# Patient Record
Sex: MALE | Race: WHITE
[De-identification: names, ages, dates, MRNs, and addresses within clinical notes are randomized per-mention and may not be internally consistent; named-entity substitution may affect disease eponyms.]

---

## 2019-08-18 ENCOUNTER — HOSPITAL ENCOUNTER (INPATIENT)
Dept: HOSPITAL 74 - JER | Age: 74
LOS: 1 days | Discharge: LEFT BEFORE DISCHARGE | DRG: 395 | End: 2019-08-19
Payer: COMMERCIAL

## 2019-08-18 VITALS — BODY MASS INDEX: 33.9 KG/M2

## 2019-08-18 VITALS — HEART RATE: 70 BPM

## 2019-08-18 DIAGNOSIS — G57.83: ICD-10-CM
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
## 2019-08-18 DIAGNOSIS — I73.9: ICD-10-CM

## 2019-08-18 DIAGNOSIS — E78.5: ICD-10-CM
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
## 2019-08-18 DIAGNOSIS — T18.128A: Primary | ICD-10-CM

## 2019-08-18 DIAGNOSIS — K44.9: ICD-10-CM

## 2019-08-18 LAB
ALBUMIN SERPL-MCNC: 3.9 G/DL (ref 3.4–5)
ALP SERPL-CCNC: 106 U/L (ref 45–117)
ALT SERPL-CCNC: 43 U/L (ref 13–61)
ANION GAP SERPL CALC-SCNC: 11 MMOL/L (ref 8–16)
ANISOCYTOSIS BLD QL: 0
AST SERPL-CCNC: 32 U/L (ref 15–37)
BACTERIA # UR AUTO: 0.5 /HPF
BASOPHILS # BLD: 0.4 % (ref 0–2)
BILIRUB SERPL-MCNC: 1.7 MG/DL (ref 0.2–1)
BILIRUB UR STRIP.AUTO-MCNC: (no result) MG/DL
BUN SERPL-MCNC: 27.3 MG/DL (ref 7–18)
CALCIUM SERPL-MCNC: 9.6 MG/DL (ref 8.5–10.1)
CASTS URNS QL MICRO: 14.6 /LPF (ref 0–8)
CHLORIDE SERPL-SCNC: 114 MMOL/L (ref 98–107)
CO2 SERPL-SCNC: 23 MMOL/L (ref 21–32)
CREAT SERPL-MCNC: 1.2 MG/DL (ref 0.55–1.3)
DEPRECATED RDW RBC AUTO: 14.4 % (ref 11.9–15.9)
EOSINOPHIL # BLD: 0.1 % (ref 0–4.5)
EPITH CASTS URNS QL MICRO: 2.5 /HPF
GLUCOSE SERPL-MCNC: 136 MG/DL (ref 74–106)
HCT VFR BLD CALC: 47.7 % (ref 35.4–49)
HGB BLD-MCNC: 16.1 GM/DL (ref 11.7–16.9)
INR BLD: 1.11 (ref 0.83–1.09)
KETONES UR QL STRIP: (no result)
LYMPHOCYTES # BLD: 1.5 % (ref 8–40)
MACROCYTES BLD QL: 0
MCH RBC QN AUTO: 29.2 PG (ref 25.7–33.7)
MCHC RBC AUTO-ENTMCNC: 33.7 G/DL (ref 32–35.9)
MCV RBC: 86.8 FL (ref 80–96)
MONOCYTES # BLD AUTO: 3.6 % (ref 3.8–10.2)
NEUTROPHILS # BLD: 94.4 % (ref 42.8–82.8)
PH UR: 5.5 [PH] (ref 5–8)
PLATELET # BLD AUTO: 298 K/MM3 (ref 134–434)
PLATELET BLD QL SMEAR: NORMAL
PMV BLD: 9.1 FL (ref 7.5–11.1)
POTASSIUM SERPLBLD-SCNC: 3.8 MMOL/L (ref 3.5–5.1)
PROT SERPL-MCNC: 7.8 G/DL (ref 6.4–8.2)
PROT UR QL STRIP: (no result)
PT PNL PPP: 13.1 SEC (ref 9.7–13)
RBC # BLD AUTO: 1.4 /HPF (ref 0–4)
RBC # BLD AUTO: 5.5 M/MM3 (ref 4–5.6)
SODIUM SERPL-SCNC: 148 MMOL/L (ref 136–145)
SP GR UR: 1.02 (ref 1.01–1.03)
UROBILINOGEN UR STRIP-MCNC: 0.2 MG/DL (ref 0.2–1)
WBC # BLD AUTO: 18 K/MM3 (ref 4–10)
WBC # UR AUTO: 1.3 /HPF (ref 0–5)
WBC NRBC COR # BLD: 100 K/MM3

## 2019-08-18 NOTE — PDOC
History of Present Illness





- General


Chief Complaint: Choking Sensation


Stated Complaint: DYSPHAGIA / SOB


Time Seen by Provider: 08/18/19 12:51


History Source: Patient


Exam Limitations: No Limitations





- History of Present Illness


Initial Comments: 





08/18/19 13:13





74M w/ hiatal hernia, HLD, spinal fusion(L4-L5), BLE neuropathy presents with 

complaint of sticking sensation in the throat x1.5days after ingesting steak on 

Friday night(8/16/19). Had an immediate sensation of something sticking in his 

lower chest. Since Friday night, could not tolerate PO intake of food or water, 

experience immediate spitting things up. Has had inability to tolerate saliva, 

constantly spitting. Has hiccups. Denies dysphonia. Feels better standing 

upright. Feels increased discomfort when lying down. Saw UC one day prior, XR 

did not show anything and sent home with metoclopramide. Pt's symptoms did not 

improve. Tried [employer] chocolate milkshake prior to presenting to [facility]. 

Denies F/C/CP/SOB/palpitations. Years prior, had steak stuck in his throat that 

prompted an ED visit that eventually passed after glucagon 1mg x2. Has had EGDs 

~q5ys with biopsies taken, no balloon dilations. Had Barium Swallow study in 

May 2019 showed a small sliding hernia, no esoph strictures.





Associated Symptoms: denies: chest pain, cough, diaphoresis, fever/chills, 

headaches, nausea/vomiting (spits up food after ingestion), shortness of breath

, syncope





Past History





- Travel


Traveled outside of the country in the last 30 days: No


Close contact w/someone who was outside of country & ill: No





- Past Medical History


Allergies/Adverse Reactions: 


 Allergies











Allergy/AdvReac Type Severity Reaction Status Date / Time


 


No Known Allergies Allergy   Verified 08/18/19 12:21











Home Medications: 


Ambulatory Orders





Ascorbic Acid [Vitamin C] 500 mg PO DAILY 10/27/16 


Calcium (Oyster Shell) [Os-Mitchel 500MG -] 500 mg PO DAILY 10/27/16 


Furosemide [Lasix -] 40 mg PO ASDIR 10/27/16 


Potassium Chloride 10 meq PO ASDIR 10/27/16 


Simvastatin 10 mg PO HS 10/27/16 


Acetaminophen [Tylenol .Regular Strength -] 650 mg PO Q4H PRN #0 tablet 10/28/

16 


Cyanocobalamin [Vitamin B12 -] 1,000 mcg PO DAILY 08/18/19 


Famotidine [Pepcid] 40 mg PO DAILY 08/18/19 


Metoclopramide HCl [Reglan -] 10 mg PO QID PRN 08/18/19 








Anemia: No


Asthma:  (c-pap at night)


Cancer: No


Cardiac Disorders: No


CVA: No


COPD: No


CHF: No


Dementia: No


Diabetes: No


GI Disorders: No


 Disorders: No


HTN: No


Hypercholesterolemia: Yes


Liver Disease: No


Seizures: No


Thyroid Disease: No


Other medical history: chronic venous insufficiency, neuropathy





- Surgical History


Abdominal Surgery: No


Appendectomy: No


Cardiac Surgery: No


Cholecystectomy: No


Lung Surgery: No


Neurologic Surgery: Yes (back, L4-5)


Orthopedic Surgery: No


Other Surgical History: 





08/18/19 13:59


BLE vein sx





- Family Disease History


Family Disease History: Heart Disease: Father (MI, HTN), Mother (HTN)





- Immunization History


Immunization Up to Date: Yes





- Suicide/Smoking/Psychosocial Hx


Smoking Status: No


Smoking History: Never smoked


Have you smoked in the past 12 months: No


Number of Cigarettes Smoked Daily: 0


Hx Alcohol Use: No


Drug/Substance Use Hx: No


Substance Use Type: None


Hx Substance Use Treatment: No





**Review of Systems





- Review of Systems


Able to Perform ROS?: Yes


Is the patient limited English proficient: No


Constitutional: No: Chills, Diaphoresis, Fever


HEENTM: Yes: Difficulty Swallowing, Mouth Swelling (will immediately spit up 

food/water/saliva).  No: Eye Pain, Blurred Vision


Respiratory: No: Cough, Orthopnea, Shortness of Breath, Stridor, Wheezing


Cardiac (ROS): No: Chest Pain (sensation of sticking in lower midline chest), 

Irregular Heart Rate, Palpitations


ABD/GI: Yes: Difficulty Swallowing.  No: Constipated, Diarrhea, Nausea, Vomiting

, Abdominal cramping


: No: Burning, Dysuria, Urgency


Integumentary: No: Erythema, Flushing


Neurological: No: Headache, Numbness, Dizziness





*Physical Exam





- Vital Signs


 Last Vital Signs











Temp Pulse Resp BP Pulse Ox


 


 98.3 F   83   18   143/87   100 


 


 08/18/19 12:22  08/18/19 12:22  08/18/19 12:22  08/18/19 12:22  08/18/19 12:22














- Physical Exam


General Appearance: Yes: Mild Distress.  No: Obese


HEENT: positive: Normal Voice, Excessive drooling (spitting clear and brown 

saliva).  negative: Scleral Icterus (R), Scleral Icterus (L)


Neck: positive: Trachea midline.  negative: Stridor, Lymphadenopathy (R), 

Lymphadenopathy (L)


Respiratory/Chest: positive: Lungs Clear, Normal Breath Sounds.  negative: 

Respiratory Distress, Labored Respiration, Crackles, Rales, Rhonchi, Stridor, 

Wheezing


Cardiovascular: positive: Regular Rhythm, Regular Rate, S1, S2


Gastrointestinal/Abdominal: positive: Soft, Other (midline rectus diastasis).  

negative: Distended, Guarding, Rebound


Extremity: positive: Normal Range of Motion.  negative: Swelling, Calf 

Tenderness, Erythema


Integumentary: positive: Dry, Warm


Neurologic: positive: Alert





ED Treatment Course





- LABORATORY


CBC & Chemistry Diagram: 


 08/18/19 14:00





 08/18/19 14:00





Medical Decision Making





- Medical Decision Making





08/18/19 14:04





- fu CBC, CMP, INR, T&S


- fu CXR, neck XR


- fu EKG


- administer glucagon 2mg IV








08/18/19 14:54


- paged GI(Dr Charles Gonzales -- [facility])





08/18/19 15:56


- Dr Gonzales called back, no longer comes to [facility] 


- Dr Lopez contacted -- recommends another glucagon 2mg IV, admission, 

possible EGD tonight


- patient informed of his bloodwork results and recommendation for admission 

with possible EGD tonight





08/18/19 23:57


- patient endorses feeling better, felt a sensation that something has passed 

into his stomach


- Dr Lopez evaluated pt, believes that EGD is no longer indicated, recommends 

discharge





*DC/Admit/Observation/Transfer


Diagnosis at time of Disposition: 


 Other foreign object in esophagus causing other injury, initial encounter








- Discharge Dispostion


Condition at time of disposition: Stable


Decision to Admit order: Yes





- Referrals





- Patient Instructions





- Post Discharge Activity

## 2019-08-18 NOTE — PDOC
Attending Attestation





- Resident


Resident Name: Karel Murry





- ED Attending Attestation


I have performed the following: I have examined & evaluated the patient, The 

case was reviewed & discussed with the resident, I agree w/resident's findings 

& plan, Exceptions are as noted





- HPI


HPI: 





75 yo M history prior food impaction, hiatal hernia presents with suspected 

food impaction. He states that he ate a piece of steak 2 days ago, felt as if 

it was stuck. He has tried ginger ale, water, but nothing has helped. He tried 

to drink a chocolate shake today to help move the food down, but to no avail. 

He states he feels as if it is stuck at the base of his chest. He has been 

regurgitating anything he tries to swallow, and after he vomits, he gets 

hiccups. 








- Physicial Exam


PE: 





GENERAL: Awake, alert, and fully oriented, in no acute distress. +Intermittent 

hiccups


HEAD: No signs of trauma


EYES: PERRLA, EOMI, sclera anicteric, conjunctiva clear


ENT: Auricles normal inspection, hearing grossly normal, nares patent, 

oropharynx clear without exudates. Moist mucosa


NECK: Normal ROM, supple, no lymphadenopathy, JVD, or masses


LUNGS: Breath sounds equal, clear to auscultation bilaterally.  No wheezes, and 

no crackles


HEART: Regular rate and rhythm, normal S1 and S2, no murmurs, rubs or gallops


ABDOMEN: Soft, nontender, normoactive bowel sounds.  No guarding, no rebound.  

No masses


EXTREMITIES: Normal range of motion, no edema.  No clubbing or cyanosis. No 

cords, erythema, or tenderness


NEUROLOGICAL: Cranial nerves II through XII grossly intact.  Normal speech, 

normal gait. Motor and sensation intact


SKIN: Warm, dry, normal turgor, no rashes or lesions noted.











- Medical Decision Making





08/18/19 13:47


Pt had similar symptoms once in the past, and glucagon helped him to pass the 

food bolus. Will give a trial of glucagon, as he indicates the lower esophagus 

as the location of the impaction. If unsuccessful, will call GI for assistance.

## 2019-08-19 VITALS — TEMPERATURE: 98.2 F | DIASTOLIC BLOOD PRESSURE: 62 MMHG | SYSTOLIC BLOOD PRESSURE: 132 MMHG

## 2019-08-19 NOTE — HOSP
Subjective





- Review of Symptoms


Events since last encounter: 





Hospitalist Encounter


Was notified by the primary RN that the patient adamantly refusing to stay, 

wants to leave right now. Was asked to assess.  








Physical Examination


Vital Signs: 


 Vital Signs











Temperature  98.2 F   08/19/19 01:12


 


Pulse Rate  70   08/19/19 01:12


 


Respiratory Rate  18   08/19/19 01:12


 


Blood Pressure  132/62   08/19/19 01:12


 


O2 Sat by Pulse Oximetry (%)  98   08/19/19 01:12











Labs: 


 CBC, BMP





 08/18/19 14:00 





 08/18/19 14:00

## 2019-08-19 NOTE — EKG
Test Reason : 

Blood Pressure : ***/*** mmHG

Vent. Rate : 076 BPM     Atrial Rate : 076 BPM

   P-R Int : 168 ms          QRS Dur : 096 ms

    QT Int : 378 ms       P-R-T Axes : 029 -27 002 degrees

   QTc Int : 425 ms

 

NORMAL SINUS RHYTHM

POSSIBLE LEFT ATRIAL ENLARGEMENT

LEFT VENTRICULAR HYPERTROPHY

ABNORMAL ECG

WHEN COMPARED WITH ECG OF 28-OCT-2016 09:26,

ST NO LONGER ELEVATED IN INFERIOR LEADS

ST NO LONGER ELEVATED IN LATERAL LEADS

NONSPECIFIC T WAVE ABNORMALITY NOW EVIDENT IN LATERAL LEADS

Confirmed by MIRNA OSUNA MD ([id number]) on 8/19/2019 11:10:52 AM

 

Referred By:             Confirmed By:MIRNA OSUNA MD

## 2019-08-19 NOTE — CONS
DATE OF CONSULTATION:  08/18/2019

 

Patient is a 74-year-old man with a past medical history of hiatal hernia,

hyperlipidemia, spinal fusion, previous episode of food impaction approximately 5

years ago who now presents with sensation of food sticking in his throat since Friday

night, at which time he ate steak.  Since Friday, he did not tolerate p.o. intake. 

He presented to the urgent care center and was given Reglan and sent home.  The

patient has been improved.  Earlier today he tried a [employer] milkshake, but again

could not tolerate p.o. intake, prompting him to come to the emergency room for

further evaluation.  While in the ER, he received 2 doses of glucagon and short while

ago was able to tolerate water and feels great at this time.  He denies any chest

pain, shortness of breath, palpitations, fevers, chills.  He states he is feeling

well.  He is a patient of Dr. Gonzales and had a barium swallow in May of 2019, which

showed this small sliding hernia.  No strictures at the time.  

 

PAST MEDICAL/SURGICAL HISTORY:  As listed in the HPI, additional surgical history of

vein surgery.

 

HOME MEDICATIONS:  Vitamin C, Lasix, potassium, simvastatin, vitamin B12, Pepcid, and

Reglan.

 

ALLERGIES:  No known drug allergies. 

 

FAMILY HISTORY:  Significant for heart disease.

 

SOCIAL HISTORY:  Does not smoke, does not drink or use drugs.  

 

REVIEW OF SYSTEMS:  As per the HPI.

 

PHYSICAL EXAMINATION:   

Vital Signs:  Temperature 98, pulse 70, respiratory rate 12, oxygen saturation 98% on

room air, blood pressure 130/64.

General:  In no acute distress.  This is a pleasant man.

HEENT:  Anicteric sclerae.  

Cardiovascular:  S1, S2.  Regular rate and rhythm.  

Lungs:  Bilaterally clear to auscultation.

Abdomen:  Soft, nontender.  

Extremities:  No edema.  

 

LABORATORY:  White blood cell count 18, hemoglobin 16, hematocrit 47, platelet count

298, INR 1.1.  Sodium 148, potassium 3.8, BUN 27, creatinine 1.2, glucose of 136,

total bilirubin 1.7, AST 32, ALT 43, alkaline phosphatase 106.  Urine with ketones. 

Chest x-ray:  Without any acute process.  Soft tissue and neck x-ray:  No gross

issues. 

 

IMPRESSION:  Dysphagia, currently resolved.  

 

RECOMMENDATIONS:  Considering he is tolerating p.o. intake, he can be discharged with

outpatient follow up with his gastroenterologist for diagnostic upper endoscopy. 

Recommend a soft diet for the next couple of weeks until he is examined with an upper

endoscopy and PPI therapy.  

 

 

DO LISA NUNEZ/[id number]

DD: 08/18/2019 23:31

DT: 08/19/2019 00:14

Job #:  [id number]

## 2019-08-20 ENCOUNTER — HOSPITAL ENCOUNTER (INPATIENT)
Dept: HOSPITAL 74 - JER | Age: 74
LOS: 3 days | Discharge: HOME | DRG: 394 | End: 2019-08-23
Payer: COMMERCIAL

## 2019-08-20 VITALS — BODY MASS INDEX: 34.2 KG/M2

## 2019-08-20 DIAGNOSIS — T18.128A: Primary | ICD-10-CM
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
## 2019-08-20 DIAGNOSIS — I10: ICD-10-CM

## 2019-08-20 DIAGNOSIS — R13.10: ICD-10-CM
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
## 2019-08-20 DIAGNOSIS — E78.5: ICD-10-CM
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
## 2019-08-20 DIAGNOSIS — R78.81: ICD-10-CM
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
## 2019-08-20 LAB
ALBUMIN SERPL-MCNC: 3.4 G/DL (ref 3.4–5)
ALP SERPL-CCNC: 105 U/L (ref 45–117)
ALT SERPL-CCNC: 77 U/L (ref 13–61)
ANION GAP SERPL CALC-SCNC: 6 MMOL/L (ref 8–16)
APPEARANCE UR: CLEAR
AST SERPL-CCNC: 55 U/L (ref 15–37)
BASOPHILS # BLD: 0.6 % (ref 0–2)
BILIRUB SERPL-MCNC: 1.2 MG/DL (ref 0.2–1)
BILIRUB UR STRIP.AUTO-MCNC: NEGATIVE MG/DL
BUN SERPL-MCNC: 18.2 MG/DL (ref 7–18)
CALCIUM SERPL-MCNC: 9 MG/DL (ref 8.5–10.1)
CHLORIDE SERPL-SCNC: 109 MMOL/L (ref 98–107)
CO2 SERPL-SCNC: 26 MMOL/L (ref 21–32)
COLOR UR: YELLOW
CREAT SERPL-MCNC: 1.2 MG/DL (ref 0.55–1.3)
DEPRECATED RDW RBC AUTO: 14.5 % (ref 11.9–15.9)
EOSINOPHIL # BLD: 2.3 % (ref 0–4.5)
GLUCOSE SERPL-MCNC: 92 MG/DL (ref 74–106)
HCT VFR BLD CALC: 46.5 % (ref 35.4–49)
HGB BLD-MCNC: 15.6 GM/DL (ref 11.7–16.9)
INR BLD: 1.08 (ref 0.83–1.09)
KETONES UR QL STRIP: NEGATIVE
LEUKOCYTE ESTERASE UR QL STRIP.AUTO: NEGATIVE
LYMPHOCYTES # BLD: 7.4 % (ref 8–40)
MCH RBC QN AUTO: 29.4 PG (ref 25.7–33.7)
MCHC RBC AUTO-ENTMCNC: 33.4 G/DL (ref 32–35.9)
MCV RBC: 87.9 FL (ref 80–96)
MONOCYTES # BLD AUTO: 15.3 % (ref 3.8–10.2)
NEUTROPHILS # BLD: 74.4 % (ref 42.8–82.8)
NITRITE UR QL STRIP: NEGATIVE
PH BLDV: 7.39 [PH] (ref 7.31–7.41)
PH UR: 5 [PH] (ref 5–8)
PLATELET # BLD AUTO: 220 K/MM3 (ref 134–434)
PMV BLD: 8.7 FL (ref 7.5–11.1)
POTASSIUM SERPLBLD-SCNC: 4.1 MMOL/L (ref 3.5–5.1)
PROT SERPL-MCNC: 6.9 G/DL (ref 6.4–8.2)
PROT UR QL STRIP: NEGATIVE
PROT UR QL STRIP: NEGATIVE
PT PNL PPP: 12.8 SEC (ref 9.7–13)
RBC # BLD AUTO: 5.3 M/MM3 (ref 4–5.6)
SODIUM SERPL-SCNC: 140 MMOL/L (ref 136–145)
SP GR UR: 1.03 (ref 1.01–1.03)
UROBILINOGEN UR STRIP-MCNC: 1 MG/DL (ref 0.2–1)
VENOUS PC02: 41.3 MMHG (ref 38–52)
VENOUS PO2: 50.5 MMHG (ref 28–48)
WBC # BLD AUTO: 5 K/MM3 (ref 4–10)

## 2019-08-20 PROCEDURE — C1751 CATH, INF, PER/CENT/MIDLINE: HCPCS

## 2019-08-20 RX ADMIN — PIPERACILLIN SODIUM,TAZOBACTAM SODIUM SCH MLS/HR: 3; .375 INJECTION, POWDER, FOR SOLUTION INTRAVENOUS at 20:53

## 2019-08-20 RX ADMIN — ATORVASTATIN CALCIUM SCH MG: 10 TABLET, FILM COATED ORAL at 23:07

## 2019-08-20 NOTE — PDOC
History of Present Illness





- General


Chief Complaint: Revisit, Lab Variance


Stated Complaint: REVISIT


Time Seen by Provider: 08/20/19 14:33


History Source: Patient


Exam Limitations: No Limitations





- History of Present Illness


Initial Comments: 





08/20/19 17:11


75yo M with PMH of HLD, spinal fusion 2014 presenting to ED for positive blood 

culture results. Patient says he was here 3d ago for food bolus, started to 

feel better and left. His WBC was 14 so blood cultures were drawn. Patient says 

that since he left the hospital he felt fine. Denies fever, chills, cough, n/v/d

, headache, rashes, urinary symptoms. No mechanical or prosthetic valves or 

stents, no recent surgeries, no recent illnesses or antibiotic use. 





PMD: Riley


PMH: see hpi


PSH: see hpi


Meds: see med rec


Allergies: nkda








Past History





- Past Medical History


Allergies/Adverse Reactions: 


 Allergies











Allergy/AdvReac Type Severity Reaction Status Date / Time


 


No Known Allergies Allergy   Verified 08/20/19 14:36











Home Medications: 


Ambulatory Orders





Ascorbic Acid [Vitamin C] 500 mg PO DAILY 10/27/16 


Calcium (Oyster Shell) [Os-Mitchel 500MG -] 500 mg PO DAILY 10/27/16 


Furosemide [Lasix -] 40 mg PO ASDIR 10/27/16 


Potassium Chloride 10 meq PO ASDIR 10/27/16 


Simvastatin 10 mg PO HS 10/27/16 


Acetaminophen [Tylenol .Regular Strength -] 650 mg PO Q4H PRN #0 tablet 10/28/

16 


Cyanocobalamin [Vitamin B12 -] 1,000 mcg PO DAILY 08/18/19 


Famotidine [Pepcid] 40 mg PO DAILY 08/18/19 


Metoclopramide HCl [Reglan -] 10 mg PO QID PRN 08/18/19 








Anemia: No


Asthma:  (c-pap at night)


Cancer: No


Cardiac Disorders: No


CVA: No


COPD: No


CHF: No


Dementia: No


Diabetes: No


GI Disorders: No


 Disorders: No


HTN: No


Hypercholesterolemia: Yes


Liver Disease: No


Seizures: No


Thyroid Disease: No





- Surgical History


Abdominal Surgery: No


Appendectomy: No


Cardiac Surgery: No


Cholecystectomy: No


Lung Surgery: No


Neurologic Surgery: Yes (back, L4-5)


Orthopedic Surgery: No





- Family Disease History


Family Disease History: Heart Disease: Father (MI, HTN), Mother (HTN)





- Immunization History


Immunization Up to Date: Yes





- Suicide/Smoking/Psychosocial Hx


Smoking Status: No


Smoking History: Never smoked


Have you smoked in the past 12 months: No


Number of Cigarettes Smoked Daily: 0


Hx Alcohol Use: No


Drug/Substance Use Hx: No


Substance Use Type: None


Hx Substance Use Treatment: No





**Review of Systems





- Review of Systems


Constitutional: No: Symptoms Reported


HEENTM: No: Symptoms Reported


Respiratory: No: Symptoms reported


Cardiac (ROS): No: Symptoms Reported


ABD/GI: No: Symptoms Reported


: No: Symptoms Reported


Musculoskeletal: No: Symptoms Reported


Integumentary: No: Symptoms Reported


Neurological: No: Symptoms reported





*Physical Exam





- Vital Signs


 Last Vital Signs











Temp Pulse Resp BP Pulse Ox


 


 98.1 F   76   18   140/62   99 


 


 08/20/19 14:34  08/20/19 14:34  08/20/19 14:34  08/20/19 14:34  08/20/19 14:34














- Physical Exam


General Appearance: Yes: Nourished, Appropriately Dressed.  No: Apparent 

Distress


HEENT: positive: EOMI, KAR, Normal ENT Inspection


Neck: positive: Trachea midline, Supple.  negative: Lymphadenopathy (R), 

Lymphadenopathy (L)


Respiratory/Chest: positive: Lungs Clear, Normal Breath Sounds.  negative: 

Crackles, Rales, Rhonchi, Stridor, Wheezing


Cardiovascular: positive: Regular Rhythm, Regular Rate, S1, S2.  negative: Edema

, JVD, Murmur


Gastrointestinal/Abdominal: positive: Normal Bowel Sounds, Soft.  negative: 

Tender


Musculoskeletal: negative: CVA Tenderness


Extremity: positive: Normal Capillary Refill.  negative: Swelling, Calf 

Tenderness


Integumentary: positive: Normal Color, Dry, Warm


Neurologic: positive: CNs II-XII NML intact, Fully Oriented, Alert, Normal Mood/

Affect, Normal Response, Motor Strength 5/5





ED Treatment Course





- LABORATORY


CBC & Chemistry Diagram: 


 08/20/19 15:17





 08/20/19 15:17





- ADDITIONAL ORDERS


Additional order review: 


 Laboratory  Results











  08/20/19 08/20/19 08/20/19





  15:17 15:17 15:17


 


PT with INR    12.80


 


INR    1.08


 


PTT (Actin FS)   


 


VBG pH  7.39  


 


POC VBG pCO2  41.3  


 


POC VBG pO2  50.5 H  


 


VBG HCO3  24.5  


 


VBG O2 Sat (Christin)  84.8 H  


 


VBG Base Excess  0  


 


Sodium   


 


Potassium   


 


Chloride   


 


Carbon Dioxide   


 


Anion Gap   


 


BUN   


 


Creatinine   


 


Est GFR (CKD-EPI)AfAm   


 


Est GFR (CKD-EPI)NonAf   


 


Random Glucose   


 


Lactic Acid   


 


Calcium   


 


Total Bilirubin   


 


AST   


 


ALT   


 


Alkaline Phosphatase   


 


Total Protein   


 


Albumin   


 


Urine Color   Yellow 


 


Urine Appearance   Clear 


 


Urine pH   5.0 


 


Ur Specific Gravity   1.027 


 


Urine Protein   Negative 


 


Urine Glucose (UA)   Negative 


 


Urine Ketones   Negative 


 


Urine Blood   Negative 


 


Urine Nitrite   Negative 


 


Urine Bilirubin   Negative 


 


Urine Urobilinogen   1.0 


 


Ur Leukocyte Esterase   Negative 














  08/20/19 08/20/19 08/20/19





  15:17 15:17 15:17


 


PT with INR   


 


INR   


 


PTT (Actin FS)    31.1


 


VBG pH   


 


POC VBG pCO2   


 


POC VBG pO2   


 


VBG HCO3   


 


VBG O2 Sat (Christin)   


 


VBG Base Excess   


 


Sodium   140 


 


Potassium   4.1 


 


Chloride   109 H 


 


Carbon Dioxide   26 


 


Anion Gap   6 L 


 


BUN   18.2 H 


 


Creatinine   1.2 


 


Est GFR (CKD-EPI)AfAm   69.60 


 


Est GFR (CKD-EPI)NonAf   60.05 


 


Random Glucose   92 


 


Lactic Acid  1.6  


 


Calcium   9.0 


 


Total Bilirubin   1.2 H 


 


AST   55 H 


 


ALT   77 H 


 


Alkaline Phosphatase   105 


 


Total Protein   6.9 


 


Albumin   3.4 


 


Urine Color   


 


Urine Appearance   


 


Urine pH   


 


Ur Specific Gravity   


 


Urine Protein   


 


Urine Glucose (UA)   


 


Urine Ketones   


 


Urine Blood   


 


Urine Nitrite   


 


Urine Bilirubin   


 


Urine Urobilinogen   


 


Ur Leukocyte Esterase   








 











  08/20/19





  15:17


 


RBC  5.30


 


MCV  87.9


 


MCHC  33.4


 


RDW  14.5


 


MPV  8.7


 


Neutrophils %  74.4  D


 


Lymphocytes %  7.4 L D


 


Monocytes %  15.3 H D


 


Eosinophils %  2.3  D


 


Basophils %  0.6














Medical Decision Making





- Medical Decision Making





08/20/19 17:45


75yo M with PMH of HLD, spinal fusion 2014 presenting to ED for positive blood 

culture results. Patient says he was here 3d ago for food bolus, started to 

feel better and left. His WBC was 14 so blood cultures were drawn. Patient says 

that since he left the hospital he felt fine. Denies fever, chills, cough, n/v/d

, headache, rashes, urinary symptoms. No mechanical or prosthetic valves or 

stents, no recent surgeries, no recent illnesses or antibiotic use. 


   vitals normal, afebrile


   pe: normal





patient had positive blood cultures in anaerobic and aerobic vials growing gram 

positive cocci in chains. had wbc of 18 when cultures were drawn. cannot 

definitively say it is contaminant. 





labs done today, no white count however given positive cultures, will admit for 

serial cultures and checking strain. 





*DC/Admit/Observation/Transfer


Diagnosis at time of Disposition: 


 Abnormal laboratory test, Positive blood culture








- Discharge Dispostion


Condition at time of disposition: Good


Decision to Admit order: Yes





- Referrals


Referrals: 


Inna Lin MD [Primary Care Provider] - 





- Patient Instructions





- Post Discharge Activity

## 2019-08-20 NOTE — HP
Admitting History and Physical





- Primary Care Physician


PCP: Inna Lin





- Admission


Chief Complaint: returned for positive blood cultures


History of Present Illness: 





Patient is a 74-year-old male with a past medical history of HLD, spinal fusion 

2014 and dysphagia.  He presents to the ED today after he was called to return 

to the ED for positive blood culture results.  Patient was in the ED on 8/18/19 

with impacted food bolus.  WBC on that admission was 18K.  Blood cultures were 

collected and he was sent home with GI follow up.  Returns to the ED today when 

his blood cultures were noted to be growing +gram cocci. 





The patient denies any fevers, chills, nausea, vomiting, diarrhea, or abdominal 

pain. Denies any chest pain, palpitations, or shortness of breath. Denies any 

headache, weakness, dizziness, lightheadedness, or changes in strength or 

sensation.  





On admission, WBC 5.0, and blood and urine cultures pending.  





 


History Source: Patient, Family Member


Limitations to Obtaining History: No Limitations





- Past Medical History


Cardiovascular: Yes: HTN, Hyperlipdemia





- Past Surgical History


Past Surgical History: Yes: Vein Stripping/Ligation (40 years ago)





- Smoking History


Smoking history: Never smoked


Have you smoked in the past 12 months: No


Aproximately how many cigarettes per day: 0





- Alcohol/Substance Use


Hx Alcohol Use: No


History of Substance Use: reports: None





- Social History


Usual Living Arrangement: Yes: With Spouse


Occupation: retired


History of Recent Travel: No





Home Medications





- Allergies


Allergies/Adverse Reactions: 


 Allergies











Allergy/AdvReac Type Severity Reaction Status Date / Time


 


No Known Allergies Allergy   Verified 08/20/19 14:36














- Home Medications


Home Medications: 


Ambulatory Orders





Ascorbic Acid [Vitamin C] 500 mg PO DAILY 10/27/16 


Calcium (Oyster Shell) [Os-Mitchel 500MG -] 500 mg PO DAILY 10/27/16 


Furosemide [Lasix -] 40 mg PO ASDIR 10/27/16 


Potassium Chloride 10 meq PO ASDIR 10/27/16 


Simvastatin 10 mg PO HS 10/27/16 


Acetaminophen [Tylenol .Regular Strength -] 650 mg PO Q4H PRN #0 tablet 10/28/

16 


Cyanocobalamin [Vitamin B12 -] 1,000 mcg PO DAILY 08/18/19 


Famotidine [Pepcid] 40 mg PO DAILY 08/18/19 


Metoclopramide HCl [Reglan -] 10 mg PO QID PRN 08/18/19 











Family Disease History





- Family Disease History


Family History: Denies





Review of Systems





- Review of Systems


Constitutional: reports: No Symptoms


Eyes: reports: No Symptoms


HENT: reports: Difficult Swallowing


Neck: reports: No Symptoms


Cardiovascular: reports: No Symptoms


Respiratory: reports: Other (mild wheezing on right upper lobe, has hx of sleep 

apnea and uses cpap at night)


Genitourinary: reports: No Symptoms





Physical Examination


Vital Signs: 


 Vital Signs











Temperature  98.1 F   08/20/19 14:34


 


Pulse Rate  76   08/20/19 14:34


 


Respiratory Rate  18   08/20/19 14:34


 


Blood Pressure  140/62   08/20/19 14:34


 


O2 Sat by Pulse Oximetry (%)  99   08/20/19 14:34











Constitutional: Yes: Well Nourished, No Distress, Calm


Eyes: Yes: WNL, Conjunctiva Clear


HENT: Yes: WNL, Atraumatic


Neck: Yes: Supple


Cardiovascular: Yes: Regular Rate and Rhythm


Respiratory: Yes: Regular, Wheezes (mild), Other


Gastrointestinal: Yes: WNL


...Rectal Exam: Yes: Deferred


Edema: No


Labs: 


 CBC, BMP





 08/20/19 15:17 





 08/20/19 15:17 











Imaging





- Results


Chest X-ray: Report Reviewed





Problem List





- Problems


(1) Positive blood culture


Assessment/Plan: 


WBC 18 on 8/18/2019 with + blood cultures (both bottles) growing gram positive 

cocci in chains


Repeat blood and urine cultures pending


no fever, no chills, lactic acid within normal limits


will empirically treat overnight pending repeat blood cultures


chest xray negative


Code(s): R78.81 - BACTEREMIA   





(2) Dysphagia


Assessment/Plan: 


soft diet


GI follow up as an outpatient


Code(s): R13.10 - DYSPHAGIA, UNSPECIFIED   





(3) Prophylactic measure


Assessment/Plan: 


fen


tolerating PO


monitor electrolytes


low salt diet





Code(s): Z29.9 - ENCOUNTER FOR PROPHYLACTIC MEASURES, UNSPECIFIED   





Visit type





- Emergency Visit


Emergency Visit: Yes


Care time: The patient presented to the Emergency Department on the above date 

and was hospitalized for further evaluation of their emergent condition.





- New Patient


This patient is new to me today: Yes


Date on this admission: 08/20/19





- Critical Care


Critical Care patient: No

## 2019-08-20 NOTE — PDOC
Rapid Medical Evaluation


Chief Complaint: Revisit, Lab Variance


Time Seen by Provider: 08/20/19 14:33


Medical Evaluation: 


 Allergies











Allergy/AdvReac Type Severity Reaction Status Date / Time


 


No Known Allergies Allergy   Verified 08/18/19 12:21











08/20/19 14:34


HPI: Told to return to the ER for + BC





PE: no gross deficits





ORDERS: Labs 





**Discharge Disposition





- Diagnosis


 Abnormal laboratory test








- Referrals





- Patient Instructions





- Post Discharge Activity

## 2019-08-20 NOTE — PDOC
Documentation entered by Laura Teixeira SCRIBE, acting as scribe for Lesli Blankenship MD.








Lesli Blankenship MD:  This documentation has been prepared by the scribe, Laura Teixeira SCRIBE, under my direction and personally reviewed by me in its 

entirety.  I confirm that the documentation accurately reflects all work, 

treatment, procedures, and medical decision making performed by me.  





Attending Attestation





- Resident


Resident Name: Birgit Carvalho





- ED Attending Attestation


I have performed the following: I have examined & evaluated the patient, The 

case was reviewed & discussed with the resident, I agree w/resident's findings 

& plan, Exceptions are as noted





- HPI


HPI: 





08/20/19 17:49


The patient is a 74-year-old male, with a past medical history of HLD, spinal 

fusion - 2014, who presents to the ED with positive blood culture results. The 

patient was seen in the ER on 08/18/19 for an impacted food bolus and had a 

white count of 18,000. He was discharged with GI follow up. 





The patient denies any fevers, chills, nausea, vomiting, diarrhea, or abdominal 

pain. Denies any chest pain, palpitations, or shortness of breath. Denies any 

headache, weakness, dizziness, lightheadedness, or changes in strength or 

sensation.





Allergies: NKA





- Physicial Exam


PE: 





08/20/19 17:50


NAD, well appearing, EOMI, PERRL, MMM, nl conjunctiva, anicteric; neck supple. 

lungs clear, RRR, abdomen soft nontender. Back nontender. LABOY x4, no focal 

neuro deficits. No peripheral edema. normal color for ethnicity, WWP.








- Medical Decision Making





08/20/19 18:29\


hpi as documented


VS reviewed, wnl


Vital Signs











Temp Pulse Resp BP Pulse Ox


 


 98.1 F   76   18   140/62   99 


 


 08/20/19 14:34  08/20/19 14:34  08/20/19 14:34  08/20/19 14:34  08/20/19 14:34








blood cultures from 8/18 positive in both for cocci in chains.


needs serial cultures, checks


no fever, nontoxic appearing


repeat labs reassuring


due to incidental finding with blood cultures obtained previously, to be 

admitted until organism identified. difficult to say it is contamination with 

both bottles positive.

## 2019-08-21 LAB
ALBUMIN SERPL-MCNC: 3 G/DL (ref 3.4–5)
ALP SERPL-CCNC: 103 U/L (ref 45–117)
ALT SERPL-CCNC: 73 U/L (ref 13–61)
ANION GAP SERPL CALC-SCNC: 6 MMOL/L (ref 8–16)
AST SERPL-CCNC: 48 U/L (ref 15–37)
BASOPHILS # BLD: 0.7 % (ref 0–2)
BILIRUB SERPL-MCNC: 1 MG/DL (ref 0.2–1)
BUN SERPL-MCNC: 14.8 MG/DL (ref 7–18)
CALCIUM SERPL-MCNC: 8.4 MG/DL (ref 8.5–10.1)
CHLORIDE SERPL-SCNC: 107 MMOL/L (ref 98–107)
CO2 SERPL-SCNC: 28 MMOL/L (ref 21–32)
CREAT SERPL-MCNC: 1.1 MG/DL (ref 0.55–1.3)
DEPRECATED RDW RBC AUTO: 14.1 % (ref 11.9–15.9)
EOSINOPHIL # BLD: 5 % (ref 0–4.5)
GLUCOSE SERPL-MCNC: 94 MG/DL (ref 74–106)
HCT VFR BLD CALC: 44.3 % (ref 35.4–49)
HGB BLD-MCNC: 14.8 GM/DL (ref 11.7–16.9)
LYMPHOCYTES # BLD: 9.3 % (ref 8–40)
MAGNESIUM SERPL-MCNC: 2.4 MG/DL (ref 1.8–2.4)
MCH RBC QN AUTO: 28.9 PG (ref 25.7–33.7)
MCHC RBC AUTO-ENTMCNC: 33.4 G/DL (ref 32–35.9)
MCV RBC: 86.6 FL (ref 80–96)
MONOCYTES # BLD AUTO: 13.8 % (ref 3.8–10.2)
NEUTROPHILS # BLD: 71.2 % (ref 42.8–82.8)
PLATELET # BLD AUTO: 211 K/MM3 (ref 134–434)
PMV BLD: 8.7 FL (ref 7.5–11.1)
POTASSIUM SERPLBLD-SCNC: 4 MMOL/L (ref 3.5–5.1)
PROT SERPL-MCNC: 6.2 G/DL (ref 6.4–8.2)
RBC # BLD AUTO: 5.12 M/MM3 (ref 4–5.6)
SODIUM SERPL-SCNC: 140 MMOL/L (ref 136–145)
WBC # BLD AUTO: 5.2 K/MM3 (ref 4–10)

## 2019-08-21 RX ADMIN — PIPERACILLIN SODIUM,TAZOBACTAM SODIUM SCH: 3; .375 INJECTION, POWDER, FOR SOLUTION INTRAVENOUS at 18:26

## 2019-08-21 RX ADMIN — PIPERACILLIN AND TAZOBACTAM SCH MLS/HR: 4; .5 INJECTION, POWDER, LYOPHILIZED, FOR SOLUTION INTRAVENOUS at 18:21

## 2019-08-21 RX ADMIN — ATORVASTATIN CALCIUM SCH MG: 10 TABLET, FILM COATED ORAL at 21:05

## 2019-08-21 RX ADMIN — PIPERACILLIN SODIUM,TAZOBACTAM SODIUM SCH MLS/HR: 3; .375 INJECTION, POWDER, FOR SOLUTION INTRAVENOUS at 10:02

## 2019-08-21 RX ADMIN — PIPERACILLIN SODIUM,TAZOBACTAM SODIUM SCH MLS/HR: 3; .375 INJECTION, POWDER, FOR SOLUTION INTRAVENOUS at 03:47

## 2019-08-21 RX ADMIN — FUROSEMIDE SCH MG: 40 TABLET ORAL at 10:02

## 2019-08-21 NOTE — PN
Progress Note (short form)





- Note


Progress Note: 





Pt called back to Er for psitive blood cultryes


 he was in ER a few days ago for impacted food bolus and was dc home


He had seen GI and ENT


started on soft diet


No complaints


 no rashes, sore throat


no fever





 Vital Signs - 24 hr











  08/20/19 08/20/19 08/21/19





  14:34 20:44 03:00


 


Temperature 98.1 F  99.1 F


 


Pulse Rate 76  


 


Pulse Rate [   64





Left Radial]   


 


Respiratory 18  16





Rate   


 


Blood Pressure 140/62  


 


Blood Pressure   120/58 L





[Left Arm]   


 


O2 Sat by Pulse 99 98 99





Oximetry (%)   














  08/21/19 08/21/19





  04:31 10:21


 


Temperature 98.4 F 98.1 F


 


Pulse Rate 63 83


 


Pulse Rate [  





Left Radial]  


 


Respiratory 18 19





Rate  


 


Blood Pressure 131/71 140/70


 


Blood Pressure  





[Left Arm]  


 


O2 Sat by Pulse 98 





Oximetry (%)  








 Current Medications











Generic Name Dose Route Start Last Admin





  Trade Name Freq  PRN Reason Stop Dose Admin


 


Acetaminophen  650 mg  08/20/19 22:22  





  Tylenol -  PO   





  Q6H PRN   





  PAIN LEVEL 4 - 6   





     





     





     


 


Atorvastatin Calcium  10 mg  08/20/19 22:00  08/20/19 23:07





  Lipitor -  PO   10 mg





  HS PAYTON   Administration





     





     





     





     


 


Furosemide  40 mg  08/21/19 10:00  08/21/19 10:02





  Lasix -  PO   40 mg





  DAILY PAYTON   Administration





     





     





     





     


 


Piperacillin Sod/Tazobactam  50 mls @ 100 mls/hr  08/20/19 18:45  





  Sod 3.375 gm/ Dextrose  IVPB   





  Q8H-IV PAYTON   





     





     





  Protocol   





     








 Laboratory Results - last 24 hr











  08/20/19 08/20/19 08/20/19





  15:17 15:17 15:17


 


WBC   5.0 


 


RBC   5.30 


 


Hgb   15.6 


 


Hct   46.5 


 


MCV   87.9 


 


MCH   29.4 


 


MCHC   33.4 


 


RDW   14.5 


 


Plt Count   220  D 


 


MPV   8.7 


 


Absolute Neuts (auto)   3.7 


 


Neutrophils %   74.4  D 


 


Lymphocytes %   7.4 L D 


 


Monocytes %   15.3 H D 


 


Eosinophils %   2.3  D 


 


Basophils %   0.6 


 


Nucleated RBC %   0 


 


PT with INR   


 


INR   


 


PTT (Actin FS)  31.1  


 


VBG pH   


 


POC VBG pCO2   


 


POC VBG pO2   


 


VBG HCO3   


 


VBG O2 Sat (Christin)   


 


VBG Base Excess   


 


Sodium    140


 


Potassium    4.1


 


Chloride    109 H


 


Carbon Dioxide    26


 


Anion Gap    6 L


 


BUN    18.2 H


 


Creatinine    1.2


 


Est GFR (CKD-EPI)AfAm    69.60


 


Est GFR (CKD-EPI)NonAf    60.05


 


Random Glucose    92


 


Lactic Acid   


 


Calcium    9.0


 


Magnesium   


 


Total Bilirubin    1.2 H


 


AST    55 H


 


ALT    77 H


 


Alkaline Phosphatase    105


 


Creatine Kinase    367 H


 


Creatine Kinase Index    1.8


 


CK-MB (CK-2)    6.7 H


 


Troponin I    < 0.02


 


Total Protein    6.9


 


Albumin    3.4


 


Urine Color   


 


Urine Appearance   


 


Urine pH   


 


Ur Specific Gravity   


 


Urine Protein   


 


Urine Glucose (UA)   


 


Urine Ketones   


 


Urine Blood   


 


Urine Nitrite   


 


Urine Bilirubin   


 


Urine Urobilinogen   


 


Ur Leukocyte Esterase   














  08/20/19 08/20/19 08/20/19





  15:17 15:17 15:17


 


WBC   


 


RBC   


 


Hgb   


 


Hct   


 


MCV   


 


MCH   


 


MCHC   


 


RDW   


 


Plt Count   


 


MPV   


 


Absolute Neuts (auto)   


 


Neutrophils %   


 


Lymphocytes %   


 


Monocytes %   


 


Eosinophils %   


 


Basophils %   


 


Nucleated RBC %   


 


PT with INR   12.80 


 


INR   1.08 


 


PTT (Actin FS)   


 


VBG pH   


 


POC VBG pCO2   


 


POC VBG pO2   


 


VBG HCO3   


 


VBG O2 Sat (Christin)   


 


VBG Base Excess   


 


Sodium   


 


Potassium   


 


Chloride   


 


Carbon Dioxide   


 


Anion Gap   


 


BUN   


 


Creatinine   


 


Est GFR (CKD-EPI)AfAm   


 


Est GFR (CKD-EPI)NonAf   


 


Random Glucose   


 


Lactic Acid  1.6  


 


Calcium   


 


Magnesium   


 


Total Bilirubin   


 


AST   


 


ALT   


 


Alkaline Phosphatase   


 


Creatine Kinase   


 


Creatine Kinase Index   


 


CK-MB (CK-2)   


 


Troponin I   


 


Total Protein   


 


Albumin   


 


Urine Color    Yellow


 


Urine Appearance    Clear


 


Urine pH    5.0


 


Ur Specific Gravity    1.027


 


Urine Protein    Negative


 


Urine Glucose (UA)    Negative


 


Urine Ketones    Negative


 


Urine Blood    Negative


 


Urine Nitrite    Negative


 


Urine Bilirubin    Negative


 


Urine Urobilinogen    1.0


 


Ur Leukocyte Esterase    Negative














  08/20/19 08/21/19 08/21/19





  15:17 04:20 04:20


 


WBC   5.2 


 


RBC   5.12 


 


Hgb   14.8 


 


Hct   44.3 


 


MCV   86.6 


 


MCH   28.9 


 


MCHC   33.4 


 


RDW   14.1 


 


Plt Count   211 


 


MPV   8.7 


 


Absolute Neuts (auto)   3.7 


 


Neutrophils %   71.2 


 


Lymphocytes %   9.3  D 


 


Monocytes %   13.8 H 


 


Eosinophils %   5.0 H D 


 


Basophils %   0.7 


 


Nucleated RBC %   0 


 


PT with INR   


 


INR   


 


PTT (Actin FS)   


 


VBG pH  7.39  


 


POC VBG pCO2  41.3  


 


POC VBG pO2  50.5 H  


 


VBG HCO3  24.5  


 


VBG O2 Sat (Christin)  84.8 H  


 


VBG Base Excess  0  


 


Sodium    140


 


Potassium    4.0


 


Chloride    107


 


Carbon Dioxide    28


 


Anion Gap    6 L


 


BUN    14.8


 


Creatinine    1.1


 


Est GFR (CKD-EPI)AfAm    77.32


 


Est GFR (CKD-EPI)NonAf    66.71


 


Random Glucose    94


 


Lactic Acid   


 


Calcium    8.4 L


 


Magnesium    2.4


 


Total Bilirubin    1.0


 


AST    48 H


 


ALT    73 H


 


Alkaline Phosphatase    103


 


Creatine Kinase   


 


Creatine Kinase Index   


 


CK-MB (CK-2)   


 


Troponin I   


 


Total Protein    6.2 L


 


Albumin    3.0 L


 


Urine Color   


 


Urine Appearance   


 


Urine pH   


 


Ur Specific Gravity   


 


Urine Protein   


 


Urine Glucose (UA)   


 


Urine Ketones   


 


Urine Blood   


 


Urine Nitrite   


 


Urine Bilirubin   


 


Urine Urobilinogen   


 


Ur Leukocyte Esterase   








S1 S2 RRR


Lungs clear


Neck-- no masses


Skin-- no lesions


Has psoriasis


Abd- soft, obese, NT


No edema


 Microbiology





08/20/19 15:17   Urine - Urine Clean Catch   Urine Culture - Final


                            NO GROWTH OBTAINED











PLAN


positive blood cultures - 2 sets-- alpha hemolytic strep


received Zosyn


Repeat blood cultures pending 


urine cultures negative


pt does not appear to be septic, his WBC is normal 


ID eval 








Problem List





- Problems


(1) Abnormal laboratory test


Code(s): R89.9 - UNSP ABNORMAL FINDING IN SPECIMENS FROM OTH ORG/TISS   





(2) Dysphagia


Code(s): R13.10 - DYSPHAGIA, UNSPECIFIED   





(3) Positive blood culture


Code(s): R78.81 - BACTEREMIA

## 2019-08-21 NOTE — CONS
DATE OF CONSULTATION:  

 

DATE OF DICTATION:  08/21/2019

 

INFECTIOUS DISEASE CONSULTATION 

 

REQUESTING PHYSICIAN:  Gina Samuel M.D. 

 

CONSULTING PHYSICIAN:  Danita Barboza M.D. 

 

HISTORY OF PRESENT ILLNESS:  This is a 74-year-old man with a past medical history of

hyperlipidemia, spinal fusion, and dysphagia.  He was called back to the emergency

room on the 20th for positive blood cultures.  He was seen on the 18th for a food

impaction.  On the 16th evening he had gone out for dinner and had steak.  He

subsequently developed food impaction, was not able to pass the food bolus.  He on

Saturday went to an urgent care center for evaluation, had some antinausea medicine,

was sent home.  The symptoms persisted.  He reports he was not even able to drink a

cup of water, and he presented on the 18th to the emergency room.  In the ER on the

18th, he was noted to have a white count of 18,000.  He was otherwise afebrile, and

he had blood cultures drawn because of elevated white count.  He had a chest x-ray

and a soft tissue _____ unremarkable.  He was treated with glucagon and then was able

to tolerate water and able to feel better, so he has had endoscopies several times in

the past.  He has had one prior food impaction, and is followed by Dr. Gonzales, and

he reports he has a hiatal hernia.  

 

PAST MEDICAL HISTORY:  Notable for hiatal hernia, hyperlipidemia, spinal fusion, one

episode of food impaction 5 years ago, and he has had vein surgery several years ago.

 

 

MEDICATION:  Medicines include vitamin C, Lasix, potassium, simvastatin, vitamin B12,

Pepcid, and Reglan.

 

ALLERGIES:  No known drug allergies.  

 

FAMILY HISTORY:  Notable for heart disease.

 

SOCIAL HISTORY:  No history of cigarette, alcohol, or substance use.  He is retired. 

He lives with his wife.  There are no sick contacts.  

 

REVIEW OF SYSTEMS:  He has no fevers or chills.  He has not had any dental work. 

Reports feeling great and having no trouble eating, which he has been doing since he

left the hospital.  He has a scheduled followup in September with Dr. Gonzales.  He

denies any dental work.  He denies any night sweats.  He denies any fevers or chills.

 He has not had any vomiting.  

 

PHYSICAL EXAMINATION:

General:  He is awake and alert in no acute distress.  

Vital Signs:  Temperature is 98.4, pulse 84, blood pressure 136/78, respiratory rate

is 18, saturating 98%.  

HEENT:  Normocephalic.  Eyes are anicteric.  

Neck:  Supple.  

Lungs:  Clear to auscultation.  

Heart:  Regular rate and rhythm.  

Abdomen:  Soft, nontender.  

Extremities:  Without edema.  He has no stigmata of endocarditis.  

 

LABORATORY:  Notable, white count on the 18th was 18, on admission on the 20th was 5.

 Hemoglobin is 15.6, platelets are 211.  His BUN and creatinine were 27 and 1.2 on

the 18th, today are 14 and 1.1.  AST of 48 and ALT of 73.  Urinalysis is

unremarkable.  It is completely negative today on the 18th.  He had protein, ketones,

and some blood, all of which have cleared.  Blood cultures from the 18th, 2 separate

bottles are growing alpha hemolytic strep, further ID pending.  Blood culture from

the 20th are negative.  Of note, on the 18th he received a dose of Unasyn 3 g in the

emergency room.  Urine culture is negative.  Repeat chest x-ray done on the 20th is

unremarkable.  An EKG as well is unremarkable.  

 

IMPRESSION:  In summary, this is a 74-year-old man admitted from home with a food

impaction with leukocytosis now noted to have a strep bacteremia, probable mouth

jennifer.  The question is, could he have had a transient bacteremia due to the food

impaction.  Also would be concerned, could he have aspirated and developed perhaps a

lung abscess.  So would suggest at this time that we would continue the Zosyn

overnight, would get a chest CT.  He will need an echo.  Will get a sedimentation

rate and a CRP with further discussion of duration of treatment based on these

results.  This is discussed at length with the presentation and his wife who is at

his bedside.  

 

 

DANITA BARBOZA M.D.

 

[id number]

DD: 08/21/2019 18:38

DT: 08/21/2019 20:24

Job #:  [id number]

## 2019-08-21 NOTE — EKG
Test Reason : 

Blood Pressure : ***/*** mmHG

Vent. Rate : 078 BPM     Atrial Rate : 078 BPM

   P-R Int : 164 ms          QRS Dur : 090 ms

    QT Int : 376 ms       P-R-T Axes : 038 -25 009 degrees

   QTc Int : 428 ms

 

NORMAL SINUS RHYTHM

MINIMAL VOLTAGE CRITERIA FOR LVH, MAY BE NORMAL VARIANT

INFERIOR INFARCT , AGE UNDETERMINED

ABNORMAL ECG

WHEN COMPARED WITH ECG OF 18-AUG-2019 16:11,

NO SIGNIFICANT CHANGE WAS FOUND

Confirmed by LUKAS FLORES, LILIA ([id number]) on 8/21/2019 10:17:12 AM

 

Referred By:             Confirmed By:LILIA GAYTAN MD

## 2019-08-21 NOTE — PN
Progress Note (short form)





- Note


Progress Note: 





ID consult dictated





imp/reccd





bacteremia- strep


s/p food impaction





he feels well





would get chest ct to r/o aspiration


would get echo in am


esr/crp


continue zosyn while awaiting the above





d/w patient and wife at length




















Problem List





- Problems


(1) Bacteremia


Code(s): R78.81 - BACTEREMIA   





(2) Food impaction of esophagus


Code(s): T18.128A - FOOD IN ESOPHAGUS CAUSING OTHER INJURY, INITIAL ENCOUNTER   


Qualifiers: 


   Encounter type: initial encounter   Qualified Code(s): T18.128A - Food in 

esophagus causing other injury, initial encounter

## 2019-08-22 LAB
ANION GAP SERPL CALC-SCNC: 8 MMOL/L (ref 8–16)
BASOPHILS # BLD: 0.8 % (ref 0–2)
BUN SERPL-MCNC: 16.9 MG/DL (ref 7–18)
CALCIUM SERPL-MCNC: 8.9 MG/DL (ref 8.5–10.1)
CHLORIDE SERPL-SCNC: 106 MMOL/L (ref 98–107)
CO2 SERPL-SCNC: 25 MMOL/L (ref 21–32)
CREAT SERPL-MCNC: 1.2 MG/DL (ref 0.55–1.3)
DEPRECATED RDW RBC AUTO: 13.9 % (ref 11.9–15.9)
EOSINOPHIL # BLD: 6.7 % (ref 0–4.5)
GLUCOSE SERPL-MCNC: 82 MG/DL (ref 74–106)
HCT VFR BLD CALC: 49.2 % (ref 35.4–49)
HGB BLD-MCNC: 16.5 GM/DL (ref 11.7–16.9)
LYMPHOCYTES # BLD: 12.4 % (ref 8–40)
MCH RBC QN AUTO: 29.1 PG (ref 25.7–33.7)
MCHC RBC AUTO-ENTMCNC: 33.4 G/DL (ref 32–35.9)
MCV RBC: 87 FL (ref 80–96)
MONOCYTES # BLD AUTO: 13.2 % (ref 3.8–10.2)
NEUTROPHILS # BLD: 66.9 % (ref 42.8–82.8)
PLATELET # BLD AUTO: 230 K/MM3 (ref 134–434)
PMV BLD: 9.3 FL (ref 7.5–11.1)
POTASSIUM SERPLBLD-SCNC: 4.2 MMOL/L (ref 3.5–5.1)
RBC # BLD AUTO: 5.66 M/MM3 (ref 4–5.6)
SODIUM SERPL-SCNC: 140 MMOL/L (ref 136–145)
WBC # BLD AUTO: 4.7 K/MM3 (ref 4–10)

## 2019-08-22 RX ADMIN — FUROSEMIDE SCH MG: 40 TABLET ORAL at 10:42

## 2019-08-22 RX ADMIN — PIPERACILLIN AND TAZOBACTAM SCH MLS/HR: 4; .5 INJECTION, POWDER, LYOPHILIZED, FOR SOLUTION INTRAVENOUS at 02:01

## 2019-08-22 RX ADMIN — PANTOPRAZOLE SODIUM SCH MG: 40 TABLET, DELAYED RELEASE ORAL at 19:04

## 2019-08-22 RX ADMIN — CEFTRIAXONE SODIUM SCH MLS/HR: 2 INJECTION, POWDER, FOR SOLUTION INTRAMUSCULAR; INTRAVENOUS at 19:04

## 2019-08-22 RX ADMIN — PIPERACILLIN AND TAZOBACTAM SCH MLS/HR: 4; .5 INJECTION, POWDER, LYOPHILIZED, FOR SOLUTION INTRAVENOUS at 10:42

## 2019-08-22 NOTE — PN
Progress Note (short form)





- Note


Progress Note: 





no complaints





feels well





echo negative


day #3 antibiotics





 Vital Signs











 Period  Temp  Pulse  Resp  BP Sys/Miller  Pulse Ox


 


 Last 24 Hr  97.6 F-98.3 F  72-82  18-18  118-145/62-81  98-98








cor-rrr


lungs clear


abd soft,nt


ext no edema





 CBC, BMP





 08/22/19 08:30 





 08/22/19 08:30 


 Laboratory Tests











  08/22/19 08/22/19





  08:30 08:30


 


ESR   11


 


C-Reactive Protein  1.7 H 








blood cultures alpha hemolytic strep





 Microbiology





08/20/19 15:17   Blood - Peripheral Venous   Blood Culture - Preliminary


                            NO GROWTH OBTAINED AFTER 48 HOURS, INCUBATION TO 

CONTINUE


                            FOR 3 DAYS.


08/20/19 15:17   Blood - Peripheral Venous   Blood Culture - Preliminary


                            NO GROWTH OBTAINED AFTER 48 HOURS, INCUBATION TO 

CONTINUE


                            FOR 3 DAYS.


08/20/19 15:17   Urine - Urine Clean Catch   Urine Culture - Final


                            NO GROWTH OBTAINED











a/p


strep bacteremia-suspect translocation from food impaction


echo WNL, inflammatory parameters wnl


day #2 antibiotics


for barium swallow tomorrow


chest ct WNL





plan for rocephin via picc line at home- can place picc line in am - rocephin 2 

grams daily for total 10 days (another week at home)

## 2019-08-22 NOTE — ECHO
______________________________________________________________________________



Name: MATHEW FERNANDES                                 Exam:Adult Echocardiogram

MRN: [id number]         Study Date: 2019 08:43 AM

Age: 74 yrs

______________________________________________________________________________



Reason For Study: R/O Endocarditis

Height: 72 in        Weight: 252 lb        BSA: 2.4 m2



______________________________________________________________________________



MMode/2D Measurements & Calculations

IVSd: 1.5 cm                                            Ao root diam: 3.1 cm

LVIDd: 3.4 cm                                           LA dimension: 3.8 cm

LVIDs: 2.3 cm

LVPWd: 1.0 cm



_________________________________________________________

EDV(Teich): 46.9 ml                                     LVOT diam: 2.0 cm

ESV(Teich): 18.8 ml



_________________________________________________________

LAV (MOD-bp): 36.3 ml



Doppler Measurements & Calculations

MV E max gideon: 85.4 cm/sec                                  Ao V2 max: 157.5 cm/sec

MV A max gideon: 100.4 cm/sec                                 Ao max P.9 mmHg

MV E/A: 0.85

MV dec time: 0.23 sec                                      KARLOS(V,D): 2.1 cm2



____________________________________________________________

LV V1 max P.4 mmHg                                     MR max gideon: 264.7 cm/sec

LV V1 max: 104.3 cm/sec                                    MR max P.0 mmHg



____________________________________________________________

TR max gideon: 237.5 cm/sec                                   PA V2 max: 145.2 cm/sec

TR max P.6 mmHg                                       PA max P.4 mmHg

____________________________________________________________



Med Peak E' Gideon: 6.4 cm/sec

Med E/e': 13.3

Lat Peak E' Gideon: 11.6 cm/sec

Lat E/e': 7.3



______________________________________________________________________________



Procedure

A complete two-dimensional transthoracic echocardiogram was performed (2D, M-mode, Doppler and color 
flow

Doppler).

Left Ventricle

There is mild concentric left ventricular hypertrophy. The left ventricular ejection fraction is norm
al.

Ejection Fraction = 60-65%. The left ventricular wall motion is normal.

Right Ventricle

The right ventricle is normal in size and function.

Atria

Normal left and right atrial size and function.

Mitral Valve

There is no mitral regurgitation noted.

Tricuspid Valve

There is trace tricuspid regurgitation. Right ventricular systolic pressure is normal.

Aortic Valve

No hemodynamically significant valvular aortic stenosis. No aortic regurgitation is present.

Pulmonic Valve

The pulmonic valve is not well visualized. There is no pulmonic valvular regurgitation.

Great Vessels

The aortic root is normal size.

Pericardium/Pleura

There is no pericardial effusion.

______________________________________________________________________________





Interpretation Summary

There is mild concentric left ventricular hypertrophy.

The left ventricular ejection fraction is normal.

The right ventricle is normal in size and function.

There is trace tricuspid regurgitation.





MD Cirilo Eisenberg 2019 01:59 PM

## 2019-08-22 NOTE — CON.GI
Consult


Consult Specialty:: GI


Referred by:: Dr. Samuel


Reason for Consultation:: Bacteremia s/p food impaction





- History of Present Illness


Chief Complaint: Esophageal food impaction


History of Present Illness: 





74M recently evaluated at the [facility] ER  secondary to esophageal food 

impaction.  He ate steak , had difficulty swallowing after that, went to 

urgent care where he received antiemetics (no relief) and ultimately came to 

the ER.  By then his symptoms had resolved and he was sent home.  He was noted 

to have a leukocytosis.  Blood cultures were drawn and he grew an alpha 

hemolytic strep. He was recalled to [facility] and admitted.  He was evaluated by ID 

who started Abx. CT of the chest failed to reveal any acute pathology within 

the chest.  Reviewing the CT scan, it appears as though he may have a hiatal 

hernia.  Mr. Jerome stated that after the food impaction resolved, he did 

experience chest soreness.  He put himself on a soft diet (pastina, etc), 

however he also was able to eat a hamburger with fries for lunch tuesday.  he 

was placed on a regular diet here and denies any odynopahgia. Recent upper GI 

series  ordered by Dr. Jones, Mr. Jerome' ENT, revealed mild tertiary 

contractions of the distal esophagus and was otherwise unrevealing.  A barium 

pill passed without difficulty. 


   He explains further that he is a patient of gastroenterologist Dr. Charles Gonzales, whom he will be seeing .  Dr. Gonzales has performed 2-3 

endoscopies for Mr. Jerome in the past, the last being 1-2 years ago.  He 

states that they have been "OK" and does remember being told of a hiatal hernia

/ He had an episode of food impaction 4-5 years ago that seemed to resolve on 

its own.  There is no family history of colorectal cancer or other GI 

malignancy.              





- History Source


History Provided By: Patient





- Past Medical History


Cardio/Vascular: Yes: HTN, Hyperlipdemia





- Past Surgical History


Past Surgical History: Yes: Vein Stripping/Ligation (40 years ago)


Additional Surgical History: Lumbar spinal surgery





- Alcohol/Substance Use


Hx Alcohol Use: No


History of Substance Use: reports: None





- Smoking History


Smoking history: Never smoked


Have you smoked in the past 12 months: No


Aproximately how many cigarettes per day: 0





- Social History


Usual Living Arrangement: With Spouse


ADL: Independent


Occupation: retired: worked for a bank


Place of Birth: [place]


History of Recent Travel: No





Home Medications





- Allergies


Allergies/Adverse Reactions: 


 Allergies











Allergy/AdvReac Type Severity Reaction Status Date / Time


 


No Known Allergies Allergy   Verified 19 14:36














- Home Medications


Home Medications: 


Ambulatory Orders





Ascorbic Acid [Vitamin C] 500 mg PO DAILY 10/27/16 


Calcium (Oyster Shell) [Os-Mitchel 500MG -] 500 mg PO DAILY 10/27/16 


Furosemide [Lasix -] 40 mg PO ASDIR 10/27/16 


Potassium Chloride 10 meq PO ASDIR 10/27/16 


Simvastatin 10 mg PO HS 10/27/16 


Acetaminophen [Tylenol .Regular Strength -] 650 mg PO Q4H PRN #0 tablet 10/28/

16 


Cyanocobalamin [Vitamin B12 -] 1,000 mcg PO DAILY 19 


Famotidine [Pepcid] 40 mg PO DAILY 19 


Metoclopramide HCl [Reglan -] 10 mg PO QID PRN 19 











Family Disease History





- Family Disease History


Family Disease History: Other: Father (: 89: h/o MI / "heart problems"), 

Mother (: 95: "Old age"), Brother (2, healthy), Son (2, 1 with DM II), 

Daughter (1, healthy)


Other Family History: No family history of colorectal cancer or other GI 

malignancy





Review of Systems





- Review of Systems


Constitutional: denies: Chills


Cardiovascular: denies: Chest Pain


Respiratory: denies: Cough


Gastrointestinal: denies: Abdominal Pain, Nausea





Physical Exam-GI


Vital Signs: 


 Vital Signs











Temperature  98.3 F   19 05:04


 


Pulse Rate  82   19 05:04


 


Respiratory Rate  18   19 09:00


 


Blood Pressure  118/62   19 05:04


 


O2 Sat by Pulse Oximetry (%)  98   19 09:00











Constitutional: Yes: Calm


Eyes: No: Sclera Icterus


Cardiovascular: Yes: Regular Rate and Rhythm.  No: Murmur


Respiratory: Yes: CTA Bilaterally


Gastrointestinal Inspection: Yes: Other (+ rectus diastasis).  No: Distention, 

Scars


...Auscultate: Yes: Normoactive Bowel Sounds


...Palpate: Yes: Soft.  No: Hepatomegaly, Splenomegaly, Tenderness


...Percussion: No: Tympanitic


Extremities: Yes: Other (B/L LE stasis changes)


Edema: No


Neurological: Yes: Alert


Labs: 


 CBC, BMP





 19 08:30 





 19 08:30 





 INR, PTT











INR  1.08  (0.83-1.09)   19  15:17    








 Hepatic Panel











Total Bilirubin  1.0 mg/dL (0.2-1)   19  04:20    


 


AST  48 U/L (15-37)  H  19  04:20    


 


ALT  73 U/L (13-61)  H  19  04:20    


 


Alkaline Phosphatase  103 U/L ()   19  04:20    


 


Albumin  3.0 g/dl (3.4-5.0)  L  19  04:20    














Problem List





- Problems


(1) Food impaction of esophagus


Assessment/Plan: 


Clinically asymptomatic. Suspect bacteremia may have been from translocation of 

bacteria in the setting of prolonged esophageal food impaction. ? if there is a 

motility issue, hiatal hernia or alternate etiology contrinuting to recurrent 

food impaction. Recent UGIS unrevealing


Advise:


Chopped diet for 1 week


Protonix 40mg once daily


Esophagram 


Follow-up with his gastroenterologist Dr. Gonzales as scheduled in     


Code(s): T18.128A - FOOD IN ESOPHAGUS CAUSING OTHER INJURY, INITIAL ENCOUNTER   


Qualifiers: 


   Encounter type: initial encounter   Qualified Code(s): T18.128A - Food in 

esophagus causing other injury, initial encounter

## 2019-08-22 NOTE — PN
Progress Note (short form)





- Note


Progress Note: 





pt walking in the hallways


tolerating diet 


No complaints


 no rashes, sore throat


no fever





  Vital Signs - 24 hr











  08/21/19 08/21/19 08/21/19





  15:00 18:33 22:00


 


Temperature 98.4 F 97.6 F 


 


Pulse Rate 84 72 


 


Respiratory 18  





Rate   


 


Blood Pressure 136/78 145/81 


 


O2 Sat by Pulse   98





Oximetry (%)   














  08/22/19





  05:04


 


Temperature 98.3 F


 


Pulse Rate 82


 


Respiratory 18





Rate 


 


Blood Pressure 118/62


 


O2 Sat by Pulse 





Oximetry (%) 








 Current Medications











Generic Name Dose Route Start Last Admin





  Trade Name Freq  PRN Reason Stop Dose Admin


 


Acetaminophen  650 mg  08/20/19 22:22  





  Tylenol -  PO   





  Q6H PRN   





  PAIN LEVEL 4 - 6   





     





     





     


 


Atorvastatin Calcium  10 mg  08/20/19 22:00  08/21/19 21:05





  Lipitor -  PO   10 mg





  [facility]   Administration





     





     





     





     


 


Furosemide  40 mg  08/21/19 10:00  08/22/19 10:42





  Lasix -  PO   40 mg





  DAILY PAY[facility]   Administration





     





     





     





     


 


Piperacillin Sod/Tazobactam  100 mls @ 200 mls/hr  08/21/19 18:00  08/22/19 10:

42





  Sod 4.5 gm/ Dextrose  IVPB   200 mls/hr





  Q8H-IV PAYTON   Administration





     





     





  Protocol   





     








 Laboratory Results - last 24 hr











  08/22/19 08/22/19 08/22/19





  08:30 08:30 08:30


 


WBC    4.7


 


RBC    5.66 H


 


Hgb    16.5


 


Hct    49.2 H


 


MCV    87.0


 


MCH    29.1


 


MCHC    33.4


 


RDW    13.9


 


Plt Count    230


 


MPV    9.3


 


Absolute Neuts (auto)    3.1


 


Neutrophils %    66.9


 


Lymphocytes %    12.4  D


 


Monocytes %    13.2 H


 


Eosinophils %    6.7 H


 


Basophils %    0.8


 


Nucleated RBC %    0


 


ESR   11 


 


Sodium  140  


 


Potassium  4.2  


 


Chloride  106  


 


Carbon Dioxide  25  


 


Anion Gap  8  


 


BUN  16.9  


 


Creatinine  1.2  


 


Est GFR (CKD-EPI)AfAm  69.60  


 


Est GFR (CKD-EPI)NonAf  60.05  


 


Random Glucose  82  


 


Calcium  8.9  


 


C-Reactive Protein  1.7 H  











S1 S2 RRR


Lungs clear


Neck-- no masses


Skin-- no lesions


Has psoriasis


Abd- soft, obese, NT


No edema


  Microbiology











 08/20/19 15:17 Blood Culture - Preliminary





 Blood - Peripheral Venous    NO GROWTH OBTAINED AFTER 24 HOURS, INCUBATION TO 

CONTINUE





    FOR 4 DAYS.


 


 08/20/19 15:17 Blood Culture - Preliminary





 Blood - Peripheral Venous    NO GROWTH OBTAINED AFTER 24 HOURS, INCUBATION TO 

CONTINUE





    FOR 4 DAYS.


 


 08/20/19 15:17 Urine Culture - Final





 Urine - Urine Clean Catch    NO GROWTH OBTAINED




















PLAN


positive blood cultures - 2 sets-- alpha hemolytic strep


ID eval noted


 CT chest-- nodules+, no lung abscess-- discussed with pt


Echo done-- results pending


on  Zosyn


Repeat blood cultures negative so far 


urine cultures negative











Problem List





- Problems


(1) Abnormal laboratory test


Code(s): R89.9 - UNSP ABNORMAL FINDING IN SPECIMENS FROM OTH ORG/TISS   





(2) Dysphagia


Code(s): R13.10 - DYSPHAGIA, UNSPECIFIED   





(3) Positive blood culture


Code(s): R78.81 - BACTEREMIA

## 2019-08-23 VITALS — SYSTOLIC BLOOD PRESSURE: 135 MMHG | DIASTOLIC BLOOD PRESSURE: 77 MMHG | HEART RATE: 74 BPM | TEMPERATURE: 98.5 F

## 2019-08-23 LAB
ALBUMIN SERPL-MCNC: 2.9 G/DL (ref 3.4–5)
ALP SERPL-CCNC: 118 U/L (ref 45–117)
ALT SERPL-CCNC: 89 U/L (ref 13–61)
ANION GAP SERPL CALC-SCNC: 6 MMOL/L (ref 8–16)
AST SERPL-CCNC: 36 U/L (ref 15–37)
BASOPHILS # BLD: 0.8 % (ref 0–2)
BILIRUB SERPL-MCNC: 0.7 MG/DL (ref 0.2–1)
BUN SERPL-MCNC: 13 MG/DL (ref 7–18)
CALCIUM SERPL-MCNC: 8.4 MG/DL (ref 8.5–10.1)
CHLORIDE SERPL-SCNC: 107 MMOL/L (ref 98–107)
CO2 SERPL-SCNC: 28 MMOL/L (ref 21–32)
CREAT SERPL-MCNC: 1.1 MG/DL (ref 0.55–1.3)
DEPRECATED RDW RBC AUTO: 14.2 % (ref 11.9–15.9)
EOSINOPHIL # BLD: 6.3 % (ref 0–4.5)
GLUCOSE SERPL-MCNC: 81 MG/DL (ref 74–106)
HCT VFR BLD CALC: 45 % (ref 35.4–49)
HGB BLD-MCNC: 15.5 GM/DL (ref 11.7–16.9)
LYMPHOCYTES # BLD: 15.6 % (ref 8–40)
MCH RBC QN AUTO: 29.7 PG (ref 25.7–33.7)
MCHC RBC AUTO-ENTMCNC: 34.5 G/DL (ref 32–35.9)
MCV RBC: 86.3 FL (ref 80–96)
MONOCYTES # BLD AUTO: 13.9 % (ref 3.8–10.2)
NEUTROPHILS # BLD: 63.4 % (ref 42.8–82.8)
PLATELET # BLD AUTO: 210 K/MM3 (ref 134–434)
PMV BLD: 9.1 FL (ref 7.5–11.1)
POTASSIUM SERPLBLD-SCNC: 4.2 MMOL/L (ref 3.5–5.1)
PROT SERPL-MCNC: 6.2 G/DL (ref 6.4–8.2)
RBC # BLD AUTO: 5.22 M/MM3 (ref 4–5.6)
SODIUM SERPL-SCNC: 141 MMOL/L (ref 136–145)
WBC # BLD AUTO: 5 K/MM3 (ref 4–10)

## 2019-08-23 PROCEDURE — 02HV33Z INSERTION OF INFUSION DEVICE INTO SUPERIOR VENA CAVA, PERCUTANEOUS APPROACH: ICD-10-PCS | Performed by: RADIOLOGY

## 2019-08-23 RX ADMIN — CEFTRIAXONE SODIUM SCH MLS/HR: 2 INJECTION, POWDER, FOR SOLUTION INTRAMUSCULAR; INTRAVENOUS at 12:10

## 2019-08-23 RX ADMIN — PANTOPRAZOLE SODIUM SCH MG: 40 TABLET, DELAYED RELEASE ORAL at 12:30

## 2019-08-23 RX ADMIN — PANTOPRAZOLE SODIUM SCH MG: 40 TABLET, DELAYED RELEASE ORAL at 12:13

## 2019-08-23 RX ADMIN — FUROSEMIDE SCH MG: 40 TABLET ORAL at 12:11

## 2019-08-23 NOTE — PN
Progress Note (short form)





- Note


Progress Note: 


pt downstairs for esophagogram and  picc line


will follow

## 2019-08-23 NOTE — DS
Physical Examination


Vital Signs: 


 Vital Signs











Temperature  98.4 F   08/22/19 20:13


 


Pulse Rate  68   08/22/19 20:13


 


Respiratory Rate  18   08/23/19 12:16


 


Blood Pressure  129/80   08/22/19 20:13


 


O2 Sat by Pulse Oximetry (%)  98   08/23/19 12:16











Findings/Remarks: 





pt seen/ examined


picc line placed


eosophagogram noted and discussed with pt


eating - soft diet ok











Constitutional: Yes: No Distress, Calm


Neck: Yes: Supple


Cardiovascular: Yes: Regular Rate and Rhythm


Respiratory: Yes: CTA Bilaterally


Gastrointestinal: Yes: Soft


Edema: No


Neurological: Yes: Alert


Labs: 


 CBC, BMP





 08/23/19 06:50 





 08/23/19 06:50 











Discharge Summary


Reason For Visit: BLOOD BACTERIALCULTURE POSITIVE


Current Active Problems





Abnormal laboratory test (Acute)


Dysphagia (Acute)


Food impaction of esophagus (Acute)


Positive blood culture (Acute)


Prophylactic measure (Acute)








Hospital Course: 





strep bactremia.


esophagogram for dysphagia-- due to food stuck





Better


discussed


wants to go home


f/u in office in 2 weeks


gi f/u - out pt 


abx x 7 more days


discussed with nursing staff also as well as Dr. Martinez.


Pt in agreement


May need Esophageal dilatation as out pt.

















Condition: Good





- Instructions


Disposition: HOME





- Home Medications


Comprehensive Discharge Medication List: 


Ambulatory Orders





Furosemide [Lasix -] 40 mg PO ASDIR 10/27/16 


Simvastatin 10 mg PO HS 10/27/16 


Acetaminophen [Tylenol .Regular Strength -] 650 mg PO Q4H PRN #0 tablet 10/28/

16 


Cyanocobalamin [Vitamin B12 -] 1,000 mcg PO DAILY 08/18/19 


Famotidine [Pepcid] 40 mg PO DAILY 08/18/19 


Metoclopramide HCl [Reglan -] 10 mg PO QID PRN 08/18/19 


Ceftriaxone [Rocephin -] 2 gm IVPB DAILY  vial 08/23/19